# Patient Record
(demographics unavailable — no encounter records)

---

## 2022-11-08 NOTE — TELEPHONE ENCOUNTER
Per last office note, pt is currently taking livalo 2 mg daily. Pt is scheduled for yearly visit with Dr. Gigi Beard on 1/11/23.

## 2022-12-08 RX ORDER — VALSARTAN 80 MG/1
80 TABLET ORAL DAILY
Qty: 60 TABLET | Refills: 0 | Status: SHIPPED | OUTPATIENT
Start: 2022-12-08

## 2022-12-08 NOTE — TELEPHONE ENCOUNTER
Pt is scheduled to see Dr. Cathi Somers on 1/11/23. Per last office note, the pt is currently taking valsartan 80 mg daily.

## 2023-02-01 RX ORDER — VALSARTAN 80 MG/1
80 TABLET ORAL DAILY
Qty: 30 TABLET | Refills: 1 | Status: SHIPPED | OUTPATIENT
Start: 2023-02-01

## 2023-02-01 NOTE — TELEPHONE ENCOUNTER
Pt has a Late yearly appt with Dr. Tomer Abraham on 3/8/23    Pt advised to keep follow up appt.     Valsartan e-prescribed to pharmacy

## 2023-02-01 NOTE — TELEPHONE ENCOUNTER
Called pt, set up appt in March.    Requested Prescriptions     Pending Prescriptions Disp Refills    valsartan (DIOVAN) 80 MG tablet 30 tablet 0     Sig: Take 1 tablet by mouth daily

## 2023-02-01 NOTE — TELEPHONE ENCOUNTER
Requested Prescriptions     Pending Prescriptions Disp Refills    valsartan (DIOVAN) 80 MG tablet 60 tablet 0     Sig: Take 1 tablet by mouth daily

## 2023-03-03 RX ORDER — VALSARTAN 80 MG/1
80 TABLET ORAL DAILY
Qty: 30 TABLET | Refills: 1 | Status: SHIPPED | OUTPATIENT
Start: 2023-03-03

## 2023-03-03 NOTE — TELEPHONE ENCOUNTER
MEDICATION REFILL REQUEST      Name of Medication:  Valsartan  Dose:  80MG  Frequency:  1POQD  Quantity:  ?  Days' supply:  ? Pharmacy Name/Location:  [facility]     Pt is requesting a refill to last him until his  wild (4/12).